# Patient Record
Sex: FEMALE | NOT HISPANIC OR LATINO | URBAN - METROPOLITAN AREA
[De-identification: names, ages, dates, MRNs, and addresses within clinical notes are randomized per-mention and may not be internally consistent; named-entity substitution may affect disease eponyms.]

---

## 2017-05-02 PROBLEM — 84089009 HIATAL HERNIA: Status: ACTIVE | Noted: 2017-05-02

## 2017-05-02 PROBLEM — 13644009 HYPERCHOLESTEROLEMIA: Status: ACTIVE | Noted: 2017-05-02

## 2017-05-02 PROBLEM — 235595009 GASTROESOPHAGEAL REFLUX DISEASE: Status: ACTIVE | Noted: 2017-05-02

## 2017-05-02 PROBLEM — 38341003 HYPERTENSION: Status: ACTIVE | Noted: 2017-05-02

## 2017-05-02 PROBLEM — 195967001 ASTHMA: Status: ACTIVE | Noted: 2017-05-02

## 2017-05-02 PROBLEM — 73430006 SLEEP APNEA: Status: ACTIVE | Noted: 2017-05-02

## 2017-05-12 PROBLEM — 21983002 HASHIMOTO THYROIDITIS: Status: ACTIVE | Noted: 2017-05-12

## 2019-10-15 PROBLEM — 161701005 HISTORY OF RESPIRATOR DEPENDENCE: Status: ACTIVE | Noted: 2019-10-15

## 2019-10-15 PROBLEM — 14304000 THYROID DISORDER: Status: ACTIVE | Noted: 2019-10-15

## 2020-06-15 ENCOUNTER — OFFICE VISIT (OUTPATIENT)
Dept: URBAN - METROPOLITAN AREA CLINIC 13 | Facility: CLINIC | Age: 70
End: 2020-06-15

## 2020-06-16 ENCOUNTER — OFFICE VISIT (OUTPATIENT)
Dept: URBAN - METROPOLITAN AREA CLINIC 13 | Facility: CLINIC | Age: 70
End: 2020-06-16

## 2020-07-14 ENCOUNTER — OFFICE VISIT (OUTPATIENT)
Dept: URBAN - METROPOLITAN AREA CLINIC 46 | Facility: CLINIC | Age: 70
End: 2020-07-14

## 2020-07-14 LAB — PDFREPORT1: (no result)

## 2020-07-16 ENCOUNTER — LAB OUTSIDE AN ENCOUNTER (OUTPATIENT)
Dept: URBAN - METROPOLITAN AREA CLINIC 13 | Facility: CLINIC | Age: 70
End: 2020-07-16

## 2020-07-17 ENCOUNTER — OFFICE VISIT (OUTPATIENT)
Dept: URBAN - METROPOLITAN AREA SURGERY CENTER 27 | Facility: SURGERY CENTER | Age: 70
End: 2020-07-17

## 2020-07-17 PROBLEM — 78275009 OBSTRUCTIVE SLEEP APNEA: Status: ACTIVE | Noted: 2020-07-17

## 2020-07-17 LAB — PDFREPORT1: (no result)

## 2020-07-22 ENCOUNTER — LAB OUTSIDE AN ENCOUNTER (OUTPATIENT)
Dept: URBAN - METROPOLITAN AREA CLINIC 13 | Facility: CLINIC | Age: 70
End: 2020-07-22

## 2020-07-24 ENCOUNTER — OFFICE VISIT (OUTPATIENT)
Dept: URBAN - METROPOLITAN AREA CLINIC 13 | Facility: CLINIC | Age: 70
End: 2020-07-24

## 2021-08-28 ENCOUNTER — TELEPHONE ENCOUNTER (OUTPATIENT)
Dept: URBAN - METROPOLITAN AREA CLINIC 13 | Facility: CLINIC | Age: 71
End: 2021-08-28

## 2021-08-28 RX ORDER — PITAVASTATIN CALCIUM 1.04 MG/1
TABLET, FILM COATED ORAL
OUTPATIENT
End: 2019-10-15

## 2021-08-28 RX ORDER — ESOMEPRAZOLE MAGNESIUM 40 MG/1
GRANULE, DELAYED RELEASE ORAL
OUTPATIENT
End: 2019-10-15

## 2021-08-28 RX ORDER — ASPIRIN 81 MG/1
TABLET ORAL
OUTPATIENT
End: 2020-06-15

## 2021-08-28 RX ORDER — ICOSAPENT ETHYL 1000 MG/1
CAPSULE ORAL
OUTPATIENT
End: 2019-10-15

## 2021-08-28 RX ORDER — NEBIVOLOL HYDROCHLORIDE 10 MG/1
TABLET ORAL
OUTPATIENT
End: 2019-10-15

## 2021-08-29 ENCOUNTER — TELEPHONE ENCOUNTER (OUTPATIENT)
Dept: URBAN - METROPOLITAN AREA CLINIC 13 | Facility: CLINIC | Age: 71
End: 2021-08-29

## 2021-08-29 RX ORDER — METFORMIN HYDROCHLORIDE 500 MG/1
TABLET ORAL
Status: ACTIVE | COMMUNITY

## 2021-08-29 RX ORDER — OMEPRAZOLE 40 MG/1
CAPSULE, DELAYED RELEASE ORAL
Status: ACTIVE | COMMUNITY

## 2021-08-29 RX ORDER — NORTRIPTYLINE HYDROCHLORIDE 25 MG/1
CAPSULE ORAL
Status: ACTIVE | COMMUNITY
Start: 2017-08-10

## 2021-08-29 RX ORDER — PRAVASTATIN SODIUM 20 MG/1
TABLET ORAL
Status: ACTIVE | COMMUNITY

## 2021-08-29 RX ORDER — ELECTROLYTES/DEXTROSE
SOLUTION, ORAL ORAL
Status: ACTIVE | COMMUNITY

## 2021-08-29 RX ORDER — METOPROLOL SUCCINATE 25 MG/1
TABLET, EXTENDED RELEASE ORAL
Status: ACTIVE | COMMUNITY

## 2021-08-29 RX ORDER — CHROMIUM 200 MCG
TABLET ORAL
Status: ACTIVE | COMMUNITY

## 2021-10-06 ENCOUNTER — OFFICE VISIT (OUTPATIENT)
Dept: URBAN - METROPOLITAN AREA CLINIC 44 | Facility: CLINIC | Age: 71
End: 2021-10-06
Payer: MEDICARE

## 2021-10-06 ENCOUNTER — LAB OUTSIDE AN ENCOUNTER (OUTPATIENT)
Dept: URBAN - METROPOLITAN AREA CLINIC 44 | Facility: CLINIC | Age: 71
End: 2021-10-06

## 2021-10-06 VITALS
OXYGEN SATURATION: 95 % | HEART RATE: 85 BPM | BODY MASS INDEX: 29.91 KG/M2 | HEIGHT: 64 IN | WEIGHT: 175.2 LBS | SYSTOLIC BLOOD PRESSURE: 113 MMHG | DIASTOLIC BLOOD PRESSURE: 56 MMHG

## 2021-10-06 DIAGNOSIS — K22.2 ESOPHAGEAL OBSTRUCTION: ICD-10-CM

## 2021-10-06 DIAGNOSIS — K44.9 DIAPHRAGMATIC HERNIA WITHOUT OBSTRUCTION OR GANGRENE: ICD-10-CM

## 2021-10-06 DIAGNOSIS — K63.5 POLYP OF COLON: ICD-10-CM

## 2021-10-06 DIAGNOSIS — R13.19 ESOPHAGEAL DYSPHAGIA: ICD-10-CM

## 2021-10-06 PROCEDURE — 99213 OFFICE O/P EST LOW 20 MIN: CPT | Performed by: INTERNAL MEDICINE

## 2021-10-06 RX ORDER — NORTRIPTYLINE HYDROCHLORIDE 25 MG/1
CAPSULE ORAL
Status: ACTIVE | COMMUNITY
Start: 2017-08-10

## 2021-10-06 RX ORDER — METFORMIN HYDROCHLORIDE 500 MG/1
1 TABLET WITH A MEAL TABLET ORAL
Status: ACTIVE | COMMUNITY

## 2021-10-06 RX ORDER — METOPROLOL SUCCINATE 25 MG/1
TABLET, EXTENDED RELEASE ORAL
Status: ACTIVE | COMMUNITY

## 2021-10-06 RX ORDER — PRAVASTATIN SODIUM 40 MG/1
1 TABLET TABLET ORAL ONCE A DAY
Status: ACTIVE | COMMUNITY

## 2021-10-06 RX ORDER — ELECTROLYTES/DEXTROSE
SOLUTION, ORAL ORAL
Status: ACTIVE | COMMUNITY

## 2021-10-06 RX ORDER — CHROMIUM 200 MCG
TABLET ORAL
Status: ACTIVE | COMMUNITY

## 2021-10-06 RX ORDER — OMEPRAZOLE 40 MG/1
CAPSULE, DELAYED RELEASE ORAL
Status: ACTIVE | COMMUNITY

## 2021-10-06 NOTE — HPI-GERD
71 year old female with a personal history of adenomatous polyps. She was diagnosed with Covid-19 September 11th. She did not get the vaccine.She was not hospitalized, but did have pneumonia in the left lung and fatigue. Follow-up X-ray was reportedly clear. She has had problems swallowing over the past 2 months, especially meat. If she drinks soft drinks she gets sick. Food impacts near the GE junction. She has to wait several minutes and often had to stick her finger down her throat to initiate a gag to get it to pass.   She had a colonoscopy 7/17/20 that showed diverticulosis with a tight turn at 25 cm and additional adenomatous polyps. She was formerly seen by Dr Kathleen with long history of resistant GERD [cough and nocturnal regurgitation]. She has been on Nortriptyline for 2 years and has felt great since starting it. She is also on Omeprazole. She is currently having no chest pain, dysphagia, nausea or vomiting. She has taken the Nortriptyline for 2 years without trying to stop it. Her cough was so bad she is afraid to try to stop it. EGD 5/12/17 showed a sliding hiatal hernia, stricture dilated to 51 [place]. (17 mm). She has not had dilation since 2017.

## 2021-11-02 ENCOUNTER — CLAIMS CREATED FROM THE CLAIM WINDOW (OUTPATIENT)
Dept: URBAN - METROPOLITAN AREA CLINIC 4 | Facility: CLINIC | Age: 71
End: 2021-11-02
Payer: MEDICARE

## 2021-11-02 ENCOUNTER — OFFICE VISIT (OUTPATIENT)
Dept: URBAN - METROPOLITAN AREA SURGERY CENTER 27 | Facility: SURGERY CENTER | Age: 71
End: 2021-11-02
Payer: MEDICARE

## 2021-11-02 DIAGNOSIS — K31.7 BENIGN GASTRIC POLYP: ICD-10-CM

## 2021-11-02 DIAGNOSIS — R13.10 ABNORMAL DEGLUTITION: ICD-10-CM

## 2021-11-02 DIAGNOSIS — K31.7 POLYP OF STOMACH AND DUODENUM: ICD-10-CM

## 2021-11-02 DIAGNOSIS — K22.2 ACQUIRED ESOPHAGEAL RING: ICD-10-CM

## 2021-11-02 PROCEDURE — 88305 TISSUE EXAM BY PATHOLOGIST: CPT | Performed by: PATHOLOGY

## 2021-11-02 PROCEDURE — 88312 SPECIAL STAINS GROUP 1: CPT | Performed by: PATHOLOGY

## 2021-11-02 PROCEDURE — 43249 ESOPH EGD DILATION <30 MM: CPT | Performed by: INTERNAL MEDICINE

## 2021-11-02 PROCEDURE — G8907 PT DOC NO EVENTS ON DISCHARG: HCPCS | Performed by: INTERNAL MEDICINE

## 2021-11-02 PROCEDURE — 43239 EGD BIOPSY SINGLE/MULTIPLE: CPT | Performed by: INTERNAL MEDICINE

## 2021-11-02 RX ORDER — NORTRIPTYLINE HYDROCHLORIDE 25 MG/1
CAPSULE ORAL
Status: ACTIVE | COMMUNITY
Start: 2017-08-10

## 2021-11-02 RX ORDER — CHROMIUM 200 MCG
TABLET ORAL
Status: ACTIVE | COMMUNITY

## 2021-11-02 RX ORDER — PRAVASTATIN SODIUM 40 MG/1
1 TABLET TABLET ORAL ONCE A DAY
Status: ACTIVE | COMMUNITY

## 2021-11-02 RX ORDER — METOPROLOL SUCCINATE 25 MG/1
TABLET, EXTENDED RELEASE ORAL
Status: ACTIVE | COMMUNITY

## 2021-11-02 RX ORDER — ELECTROLYTES/DEXTROSE
SOLUTION, ORAL ORAL
Status: ACTIVE | COMMUNITY

## 2021-11-02 RX ORDER — OMEPRAZOLE 40 MG/1
CAPSULE, DELAYED RELEASE ORAL
Status: ACTIVE | COMMUNITY

## 2021-11-02 RX ORDER — METFORMIN HYDROCHLORIDE 500 MG/1
1 TABLET WITH A MEAL TABLET ORAL
Status: ACTIVE | COMMUNITY

## 2021-11-09 ENCOUNTER — WEB ENCOUNTER (OUTPATIENT)
Dept: URBAN - METROPOLITAN AREA CLINIC 44 | Facility: CLINIC | Age: 71
End: 2021-11-09

## 2021-11-30 ENCOUNTER — WEB ENCOUNTER (OUTPATIENT)
Dept: URBAN - METROPOLITAN AREA CLINIC 44 | Facility: CLINIC | Age: 71
End: 2021-11-30

## 2021-11-30 RX ORDER — NORTRIPTYLINE HYDROCHLORIDE 25 MG/1
1 CAPSULE CAPSULE ORAL ONCE A DAY
Qty: 30 CAPSULE | Refills: 11 | OUTPATIENT
Start: 2017-08-10

## 2021-12-01 ENCOUNTER — WEB ENCOUNTER (OUTPATIENT)
Dept: URBAN - METROPOLITAN AREA CLINIC 44 | Facility: CLINIC | Age: 71
End: 2021-12-01

## 2021-12-03 ENCOUNTER — ERX REFILL RESPONSE (OUTPATIENT)
Dept: URBAN - METROPOLITAN AREA CLINIC 44 | Facility: CLINIC | Age: 71
End: 2021-12-03

## 2021-12-03 RX ORDER — NORTRIPTYLINE HYDROCHLORIDE 25 MG/1
1 CAPSULE CAPSULE ORAL ONCE A DAY
Qty: 30 CAPSULE | Refills: 11 | OUTPATIENT

## 2021-12-03 RX ORDER — NORTRIPTYLINE HYDROCHLORIDE 25 MG/1
TAKE 1 CAPSULE BY MOUTH EVERY DAY CAPSULE ORAL
Qty: 90 CAPSULE | Refills: 1 | OUTPATIENT

## 2023-01-25 ENCOUNTER — ERX REFILL RESPONSE (OUTPATIENT)
Dept: URBAN - METROPOLITAN AREA CLINIC 44 | Facility: CLINIC | Age: 73
End: 2023-01-25

## 2023-01-25 RX ORDER — OMEPRAZOLE 40 MG/1
TAKE 1 CAPSULE BY MOUTH EVERY DAY 30 MINUTES BEFORE BREAKFAST CAPSULE, DELAYED RELEASE ORAL
Qty: 90 CAPSULE | Refills: 3 | OUTPATIENT

## 2023-01-25 RX ORDER — OMEPRAZOLE 40 MG/1
CAPSULE, DELAYED RELEASE ORAL
OUTPATIENT

## 2023-12-13 ENCOUNTER — DASHBOARD ENCOUNTERS (OUTPATIENT)
Age: 73
End: 2023-12-13

## 2023-12-14 ENCOUNTER — OFFICE VISIT (OUTPATIENT)
Dept: URBAN - METROPOLITAN AREA CLINIC 48 | Facility: CLINIC | Age: 73
End: 2023-12-14
Payer: MEDICARE

## 2023-12-14 ENCOUNTER — LAB OUTSIDE AN ENCOUNTER (OUTPATIENT)
Dept: URBAN - METROPOLITAN AREA CLINIC 48 | Facility: CLINIC | Age: 73
End: 2023-12-14

## 2023-12-14 VITALS
BODY MASS INDEX: 32.91 KG/M2 | SYSTOLIC BLOOD PRESSURE: 143 MMHG | DIASTOLIC BLOOD PRESSURE: 82 MMHG | HEIGHT: 64 IN | WEIGHT: 192.8 LBS | TEMPERATURE: 97.7 F | HEART RATE: 88 BPM | OXYGEN SATURATION: 99 %

## 2023-12-14 DIAGNOSIS — K63.5 POLYP OF COLON: ICD-10-CM

## 2023-12-14 DIAGNOSIS — R05.3 CHRONIC COUGH: ICD-10-CM

## 2023-12-14 DIAGNOSIS — Z83.71 FAMILY HISTORY OF COLON POLYPS: ICD-10-CM

## 2023-12-14 DIAGNOSIS — K21.9 GASTROESOPHAGEAL REFLUX DISEASE WITHOUT ESOPHAGITIS: ICD-10-CM

## 2023-12-14 DIAGNOSIS — R13.19 ESOPHAGEAL DYSPHAGIA: ICD-10-CM

## 2023-12-14 DIAGNOSIS — Z83.710 FAMILY HISTORY OF FAMILIAL ADENOMATOUS POLYPOSIS: ICD-10-CM

## 2023-12-14 PROBLEM — 68154008: Status: ACTIVE | Noted: 2023-12-14

## 2023-12-14 PROBLEM — 266435005: Status: ACTIVE | Noted: 2023-12-14

## 2023-12-14 PROBLEM — 40890009: Status: ACTIVE | Noted: 2023-12-14

## 2023-12-14 PROCEDURE — 99213 OFFICE O/P EST LOW 20 MIN: CPT | Performed by: NURSE PRACTITIONER

## 2023-12-14 RX ORDER — OMEPRAZOLE 40 MG/1
1 CAPSULE 30 MINUTES BEFORE MORNING MEAL CAPSULE, DELAYED RELEASE ORAL ONCE A DAY
Status: ACTIVE | COMMUNITY

## 2023-12-14 RX ORDER — FLUTICASONE PROPIONATE 50 UG/1
1 SPRAY IN EACH NOSTRIL SPRAY, METERED NASAL ONCE A DAY
Status: ACTIVE | COMMUNITY

## 2023-12-14 RX ORDER — METOPROLOL SUCCINATE 50 MG/1
1 TABLET TABLET, EXTENDED RELEASE ORAL ONCE A DAY
Qty: 90 TABLET | Status: ACTIVE | COMMUNITY

## 2023-12-14 RX ORDER — CLONAZEPAM 0.5 MG/1
1 TABLET TABLET ORAL ONCE A DAY
Qty: 30 TABLET | Status: ACTIVE | COMMUNITY

## 2023-12-14 RX ORDER — ROSUVASTATIN CALCIUM 20 MG/1
1 TABLET TABLET, FILM COATED ORAL ONCE A DAY
Qty: 90 TABLET | Status: ACTIVE | COMMUNITY

## 2023-12-14 RX ORDER — PANTOPRAZOLE SODIUM 40 MG/1
1 TABLET TABLET, DELAYED RELEASE ORAL ONCE A DAY
Qty: 30 | Refills: 3 | OUTPATIENT
Start: 2023-12-14

## 2023-12-14 RX ORDER — NORTRIPTYLINE HYDROCHLORIDE 25 MG/1
1 CAPSULE CAPSULE ORAL ONCE A DAY
Qty: 90 CAPSULE | Status: ACTIVE | COMMUNITY

## 2023-12-14 NOTE — HPI-GERD
73 year old female presents for evaluation of chronic cough. Her cough is worse after she eats and drinks. Previously seen for GERD and dysphagia and is status post dilatation. Currently, she is having more reugitation of coffee and some food. EGD in 2021, showed a benign stricture status post 18mm balloon dilatation.  GERD has not been controlled recently, despite taking Omeprazole 40mg. The patient has seen Dr. Kathleen in the past for chronic cough and has been on Nortriptyline 25mg for years. She stopped it in 7/2023 but will restart it per pulmonology request. Her cough had improved but got worse after COVID in 2021.   The patient has a history of adenomatous polyps. She had a colonoscopy 7/17/20 that showed diverticulosis with a tight turn at 25 cm and polyp that biopsy resulted as a mucosa fold.

## 2024-01-24 ENCOUNTER — OFFICE VISIT (OUTPATIENT)
Dept: URBAN - METROPOLITAN AREA SURGERY CENTER 28 | Facility: SURGERY CENTER | Age: 74
End: 2024-01-24

## 2024-12-03 ENCOUNTER — LAB OUTSIDE AN ENCOUNTER (OUTPATIENT)
Dept: URBAN - METROPOLITAN AREA CLINIC 48 | Facility: CLINIC | Age: 74
End: 2024-12-03

## 2024-12-03 ENCOUNTER — OFFICE VISIT (OUTPATIENT)
Dept: URBAN - METROPOLITAN AREA CLINIC 48 | Facility: CLINIC | Age: 74
End: 2024-12-03
Payer: MEDICARE

## 2024-12-03 VITALS
SYSTOLIC BLOOD PRESSURE: 161 MMHG | WEIGHT: 192.3 LBS | BODY MASS INDEX: 32.83 KG/M2 | HEART RATE: 83 BPM | HEIGHT: 64 IN | TEMPERATURE: 97.8 F | DIASTOLIC BLOOD PRESSURE: 91 MMHG

## 2024-12-03 DIAGNOSIS — K21.9 GASTROESOPHAGEAL REFLUX DISEASE WITHOUT ESOPHAGITIS: ICD-10-CM

## 2024-12-03 DIAGNOSIS — R93.3 ABNORMAL DIGESTIVE SYSTEM DIAGNOSTIC IMAGING: ICD-10-CM

## 2024-12-03 DIAGNOSIS — K63.5 POLYP OF COLON: ICD-10-CM

## 2024-12-03 DIAGNOSIS — K22.2 ESOPHAGEAL STENOSIS: ICD-10-CM

## 2024-12-03 DIAGNOSIS — R09.A2 GLOBUS SENSATION: ICD-10-CM

## 2024-12-03 DIAGNOSIS — R05.3 CHRONIC COUGH: ICD-10-CM

## 2024-12-03 PROBLEM — 267103008: Status: ACTIVE | Noted: 2024-12-03

## 2024-12-03 PROBLEM — 300286002: Status: ACTIVE | Noted: 2024-12-03

## 2024-12-03 PROBLEM — 386618008: Status: ACTIVE | Noted: 2024-12-03

## 2024-12-03 PROCEDURE — 99214 OFFICE O/P EST MOD 30 MIN: CPT | Performed by: NURSE PRACTITIONER

## 2024-12-03 RX ORDER — METOPROLOL SUCCINATE 50 MG/1
1 TABLET TABLET, EXTENDED RELEASE ORAL ONCE A DAY
Qty: 90 TABLET | Status: ACTIVE | COMMUNITY

## 2024-12-03 RX ORDER — OMEPRAZOLE 40 MG/1
1 CAPSULE 30 MINUTES BEFORE MORNING MEAL CAPSULE, DELAYED RELEASE ORAL ONCE A DAY
Status: ACTIVE | COMMUNITY

## 2024-12-03 RX ORDER — FLUTICASONE PROPIONATE 50 UG/1
1 SPRAY IN EACH NOSTRIL SPRAY, METERED NASAL ONCE A DAY
Status: ACTIVE | COMMUNITY

## 2024-12-03 RX ORDER — ROSUVASTATIN CALCIUM 20 MG/1
1 TABLET TABLET, FILM COATED ORAL ONCE A DAY
Qty: 90 TABLET | Status: ACTIVE | COMMUNITY

## 2024-12-03 RX ORDER — NORTRIPTYLINE HYDROCHLORIDE 25 MG/1
1 CAPSULE CAPSULE ORAL
Qty: 90 | Refills: 1 | Status: ACTIVE | COMMUNITY
Start: 2024-04-25

## 2024-12-03 RX ORDER — CLONAZEPAM 0.5 MG/1
1 TABLET TABLET ORAL ONCE A DAY
Qty: 30 TABLET | Status: ACTIVE | COMMUNITY

## 2024-12-03 NOTE — HPI-GERD
74 year old female presents for follow up after CT and of chronic cough. Her cough is worse after she eats and drinks. 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 She was seen in 12/2023 at which time an EGD was ordered but she decided not to do it. Currently, she has globus sensation, a chronic cough, reflux, and some regurgitation of liquids. Aortic aneurysm has been stable and she is evaluated every 6 months. EGD in 2021, showed a benign stricture status post 18mm balloon dilatation. Omeprazole was changed to Pantoprazole 40mg but reflux was not as well controlled, so she restarted Omeprazole. The patient has seen Dr. Kathleen in the past for chronic cough and has been on Nortriptyline 25mg for years which used to help.  The patient has a history of adenomatous polyps. She had a colonoscopy 7/17/2020 that showed diverticulosis with a tight turn at 25 cm and polyp that biopsy resulted as a mucosa fold.

## 2024-12-17 ENCOUNTER — TELEPHONE ENCOUNTER (OUTPATIENT)
Dept: URBAN - METROPOLITAN AREA CLINIC 46 | Facility: CLINIC | Age: 74
End: 2024-12-17

## 2024-12-23 ENCOUNTER — TELEPHONE ENCOUNTER (OUTPATIENT)
Dept: URBAN - METROPOLITAN AREA CLINIC 48 | Facility: CLINIC | Age: 74
End: 2024-12-23

## 2024-12-23 ENCOUNTER — LAB OUTSIDE AN ENCOUNTER (OUTPATIENT)
Dept: URBAN - METROPOLITAN AREA CLINIC 46 | Facility: CLINIC | Age: 74
End: 2024-12-23

## 2025-02-19 ENCOUNTER — OFFICE VISIT (OUTPATIENT)
Dept: URBAN - METROPOLITAN AREA SURGERY CENTER 28 | Facility: SURGERY CENTER | Age: 75
End: 2025-02-19

## 2025-05-28 ENCOUNTER — OFFICE VISIT (OUTPATIENT)
Dept: URBAN - METROPOLITAN AREA MEDICAL CENTER 35 | Facility: MEDICAL CENTER | Age: 75
End: 2025-05-28

## 2025-06-12 ENCOUNTER — TELEPHONE ENCOUNTER (OUTPATIENT)
Dept: URBAN - METROPOLITAN AREA CLINIC 48 | Facility: CLINIC | Age: 75
End: 2025-06-12

## 2025-08-05 ENCOUNTER — OFFICE VISIT (OUTPATIENT)
Dept: URBAN - METROPOLITAN AREA CLINIC 44 | Facility: CLINIC | Age: 75
End: 2025-08-05
Payer: MEDICARE

## 2025-08-05 DIAGNOSIS — R05.3 CHRONIC COUGH: ICD-10-CM

## 2025-08-05 DIAGNOSIS — R09.A2 GLOBUS SENSATION: ICD-10-CM

## 2025-08-05 DIAGNOSIS — Z79.899 HIGH RISK MEDICATION USE: ICD-10-CM

## 2025-08-05 DIAGNOSIS — R23.4 PARAKERATOSIS: ICD-10-CM

## 2025-08-05 DIAGNOSIS — Z86.0101 H/O ADENOMATOUS POLYP OF COLON: ICD-10-CM

## 2025-08-05 DIAGNOSIS — Q39.6 ESOPHAGEAL DIVERTICULUM: ICD-10-CM

## 2025-08-05 PROCEDURE — 99213 OFFICE O/P EST LOW 20 MIN: CPT | Performed by: INTERNAL MEDICINE

## 2025-08-05 RX ORDER — ROSUVASTATIN CALCIUM 20 MG/1
1 TABLET TABLET, FILM COATED ORAL ONCE A DAY
Qty: 90 TABLET | Status: ACTIVE | COMMUNITY

## 2025-08-05 RX ORDER — CLONAZEPAM 0.5 MG/1
1 TABLET TABLET ORAL ONCE A DAY
Qty: 30 TABLET | Status: ACTIVE | COMMUNITY

## 2025-08-05 RX ORDER — NORTRIPTYLINE HYDROCHLORIDE 25 MG/1
1 CAPSULE CAPSULE ORAL
Qty: 90 | Refills: 1 | Status: ACTIVE | COMMUNITY
Start: 2024-04-25

## 2025-08-05 RX ORDER — METOPROLOL SUCCINATE 50 MG/1
1 TABLET TABLET, EXTENDED RELEASE ORAL ONCE A DAY
Qty: 90 TABLET | Status: ACTIVE | COMMUNITY

## 2025-08-05 RX ORDER — OMEPRAZOLE 40 MG/1
1 CAPSULE 30 MINUTES BEFORE MORNING MEAL CAPSULE, DELAYED RELEASE ORAL ONCE A DAY
Status: ACTIVE | COMMUNITY

## 2025-08-05 RX ORDER — FLUTICASONE PROPIONATE 50 UG/1
1 SPRAY IN EACH NOSTRIL SPRAY, METERED NASAL ONCE A DAY
Status: ACTIVE | COMMUNITY

## 2025-08-23 LAB
FOLATE, SERUM: 19.5
VITAMIN B12: 246
VITAMIN D,25-OH,TOTAL,IA: 43